# Patient Record
(demographics unavailable — no encounter records)

---

## 2018-08-26 NOTE — CAT SCAN REPORT
FINAL REPORT



PROCEDURE:  CT HEAD/BRAIN WO CON



TECHNIQUE:  Computerized tomography of the head was performed

without contrast material. 



HISTORY:  headache 



COMPARISON:  No prior studies are available for comparison.



FINDINGS:  

Brain: Brain density appears normal.  No evidence of intracranial

hemorrhage.  No parenchymal hemorrhage, mass lesions or mass

effect are seen. No abnormal extraxial fluid collects or masses

are seen.



Ventricles: Ventricles are normal size and are midline. 



Bone Windows:  No evidence of skull fracture.



Paranasal sinuses: Visualized portions are clear.



Mastoid air cells: Clear



IMPRESSION:  

Negative exam.

## 2018-08-26 NOTE — EMERGENCY DEPARTMENT REPORT
ED Headache HPI





- General


Chief Complaint: Headache


Stated Complaint: HEAD ACHE BODY PAIN


Time Seen by Provider: 08/26/18 12:27





- History of Present Illness


Initial Comments: 





This is a 39-year-old male nontoxic, well nourished in appearance, no acute 

signs of distress presents to the ED with c/o of acute headache.  Patient 

describes headache as diffuse with level of 3 out of 10.  Patient denies 

thunderclap headache.  Patient denies any radiation of pain.  Patient denies 

any head trauma.  Patient denies any visual changes.  Patient denies worse 

headache.  Patient stated that darkness makes headache better and bright lights 

make the headache worse.  Patient denies any numbness, tingling, fever, chills, 

nausea, vomiting, chest pain, shortness of breath, stiff neck.  Patient denies 

any radiation of pain.  Patient denies any allergies or significant PMH.


Quality: mild, achy


Head Injury Location: other (diffuse)


Recent Head Trauma: no recent headache/trauma


Associated Symptoms: denies symptoms.  denies: confusion, fatigue, facial pain, 

fever/chills, flushing, loss of consciousness, nausea/vomiting, nasal congestion

, nasal drainage, numbness in legs/feet, rash, seizures, sinus infection, stiff 

neck, vision changes, weakness


Home Medications: 


Ambulatory Orders





Butalb/Acetamin/Caff -40 [Fioricet] 1 tab PO Q6HR PRN #14 tab 08/26/18 











ED Review of Systems


ROS: 


Stated complaint: HEAD ACHE BODY PAIN


Other details as noted in HPI





Constitutional: denies: chills, fever


Eyes: denies: eye pain, eye discharge, vision change


ENT: denies: ear pain, throat pain


Respiratory: denies: cough, shortness of breath, wheezing


Cardiovascular: denies: chest pain, palpitations


Endocrine: no symptoms reported


Gastrointestinal: denies: abdominal pain, nausea, diarrhea


Genitourinary: denies: urgency, dysuria


Musculoskeletal: denies: back pain, joint swelling, arthralgia


Skin: denies: rash, lesions


Neurological: headache.  denies: weakness, paresthesias


Psychiatric: denies: anxiety, depression


Hematological/Lymphatic: denies: easy bleeding, easy bruising





ED Past Medical Hx





- Past Medical History


Hx Asthma: Yes





- Surgical History


Past Surgical History?: No





- Social History


Smoking Status: Current Every Day Smoker


Substance Use Type: None





- Medications


Home Medications: 


 Home Medications











 Medication  Instructions  Recorded  Confirmed  Last Taken  Type


 


Butalb/Acetamin/Caff -40 1 tab PO Q6HR PRN #14 tab 08/26/18  Unknown Rx





[Fioricet]     














ED Physical Exam





- General


Limitations: No Limitations


General appearance: alert, in no apparent distress





- Head


Head exam: Present: atraumatic, normocephalic





- Eye


Eye exam: Present: normal appearance, PERRL, EOMI


Pupils: Present: normal accommodation





- ENT


ENT exam: Present: normal exam, mucous membranes moist





- Neck


Neck exam: Present: normal inspection, full ROM.  Absent: tenderness, 

meningismus, lymphadenopathy





- Respiratory


Respiratory exam: Present: normal lung sounds bilaterally.  Absent: respiratory 

distress, wheezes, rales, rhonchi, stridor, chest wall tenderness, accessory 

muscle use, decreased breath sounds, prolonged expiratory





- Cardiovascular


Cardiovascular Exam: Present: regular rate, normal rhythm, normal heart sounds.

  Absent: bradycardia, tachycardia, irregular rhythm, systolic murmur, 

diastolic murmur, rubs, gallop





- GI/Abdominal


GI/Abdominal exam: Present: soft, normal bowel sounds





- Rectal


Rectal exam: Present: deferred





- Extremities Exam


Extremities exam: Present: normal inspection, full ROM, normal capillary 

refill.  Absent: tenderness





- Back Exam


Back exam: Present: normal inspection, full ROM.  Absent: tenderness, CVA 

tenderness (R), CVA tenderness (L), muscle spasm, paraspinal tenderness, 

vertebral tenderness, rash noted





- Neurological Exam


Neurological exam: Present: alert, oriented X3, CN II-XII intact, normal gait





- Expanded Neurological Exam


  ** Expanded


Patient oriented to: Present: person, place, time


Cranial nerves: EOM's Intact: Normal, Gag Reflex: Normal, Facial Sensation: 

Normal


Cerebellar function: Finger to Nose: Normal


Upper motor neuron: Pronator Drift: Normal, Sensory Extinction: Normal


Sensory exam: Upper Extremity Light Touch: Normal, Upper Extremity Pin Prick: 

Normal, Upper Extremity Temperature: Normal, UE 2 Point Discrimination: Normal, 

Lower Extremity Light Touch: Normal, Lower Extremity Pin Prick: Normal, Lower 

Extremity Temperature: Normal, LE 2 Point Discrimination: Normal


Motor strength exam: RUE: 5, LUE: 5, RLE: 5, LLE: 5


Best Eye Response (Charles): (4) open spontaneously


Best Motor Response (Toutle): (6) obeys commands


Best Verbal Response (Charles): (5) oriented


Charles Total: 15





- Psychiatric


Psychiatric exam: Present: normal affect, normal mood





- Skin


Skin exam: Present: warm, dry, intact, normal color.  Absent: rash





ED Course


 Vital Signs











  08/26/18





  11:52


 


Temperature 98.1 F


 


Pulse Rate 76


 


Respiratory 16





Rate 


 


Blood Pressure 138/78


 


O2 Sat by Pulse 98





Oximetry 














- Reevaluation(s)


Reevaluation #1: 





08/26/18 12:54


Patient is speaking in full sentences with no signs of distress noted.





ED Medical Decision Making





- Medical Decision Making





This is a 39-year-old male that presents with headache.  Patient is stable and 

was examined by me.  Patient is neurologically stable.  There is no stiff neck 

or neck pain.  Vital signs are stable.  Patient is afebrile.  CT head/brain 

obtained and dictated by the radiologist within normal limits.  Patient was 

notified of the CT report with no questions noted.  Patient received Benadryl, 

Reglan, Toradol, and 1 L of normal saline which the patient stated that 

headache has subsided and resolved.  Patient was instructed not to operate any 

machinery after discharged due to drowsiness of Benadryl.  Patient stated that 

a family member (wife) will drive patient home.  Patient is discharged with 

Fioricet.  Patient was referred to Follow-up with a primary care/neurologist 

doctor in 3-5 days or if symptoms worsen and continue return to emergency room 

as soon as possible.  At time of discharge, the patient does not seem toxic or 

ill in appearance.  No acute signs of distress noted.  Patient agrees to 

discharge treatment plan of care.  No further questions noted by the patient.


Critical care attestation.: 


If time is entered above; I have spent that time in minutes in the direct care 

of this critically ill patient, excluding procedure time.








ED Disposition


Clinical Impression: 


Headache


Qualifiers:


 Headache type: unspecified Headache chronicity pattern: acute headache 

Intractability: not intractable Qualified Code(s): R51 - Headache





Disposition: DC-01 TO HOME OR SELFCARE


Is pt being admited?: No


Condition: Stable


Instructions:  Acute Headache (ED), Butalbital/Aspirin/Caffeine (By mouth)


Additional Instructions: 


Follow-up with a primary care doctor in 3-5 days or if symptoms worsen and 

continue return to emergency room as soon as possible. 


Prescriptions: 


Butalb/Acetamin/Caff -40 [Fioricet] 1 tab PO Q6HR PRN #14 tab


 PRN Reason: Headache


Referrals: 


PRIMARY CARE,MD [Primary Care Provider] - 3-5 Days


YOUSIF AUGUSTE MD [Staff Physician] - 3-5 Days


[facility] [Outside] - 3-5 Days


[facility] [Outside] - 3-5 Days


Forms:  Work/School Release Form(ED)